# Patient Record
Sex: FEMALE | Race: OTHER
[De-identification: names, ages, dates, MRNs, and addresses within clinical notes are randomized per-mention and may not be internally consistent; named-entity substitution may affect disease eponyms.]

---

## 2020-09-21 PROBLEM — Z00.00 ENCOUNTER FOR PREVENTIVE HEALTH EXAMINATION: Status: ACTIVE | Noted: 2020-09-21

## 2020-10-07 PROBLEM — Z78.9 CAFFEINE USE: Status: ACTIVE | Noted: 2020-10-07

## 2020-10-07 PROBLEM — Z87.891 FORMER SMOKER: Status: ACTIVE | Noted: 2020-10-07

## 2020-10-07 RX ORDER — LIRAGLUTIDE 6 MG/ML
18 INJECTION SUBCUTANEOUS
Refills: 0 | Status: ACTIVE | COMMUNITY

## 2020-10-07 RX ORDER — LISINOPRIL 20 MG/1
20 TABLET ORAL
Refills: 0 | Status: ACTIVE | COMMUNITY

## 2020-10-07 RX ORDER — CHOLECALCIFEROL (VITAMIN D3) 125 MCG
50 MCG TABLET ORAL
Refills: 0 | Status: ACTIVE | COMMUNITY

## 2020-10-07 RX ORDER — LEVOTHYROXINE SODIUM 175 UG/1
175 TABLET ORAL
Refills: 0 | Status: ACTIVE | COMMUNITY

## 2020-10-07 RX ORDER — OMEPRAZOLE 40 MG/1
40 CAPSULE, DELAYED RELEASE ORAL
Refills: 0 | Status: ACTIVE | COMMUNITY

## 2020-10-07 RX ORDER — SAXAGLIPTIN AND METFORMIN HYDROCHLORIDE 5; 1000 MG/1; MG/1
5-1000 TABLET, FILM COATED, EXTENDED RELEASE ORAL
Refills: 0 | Status: ACTIVE | COMMUNITY

## 2020-10-08 ENCOUNTER — APPOINTMENT (OUTPATIENT)
Dept: HEMATOLOGY ONCOLOGY | Facility: CLINIC | Age: 50
End: 2020-10-08
Payer: COMMERCIAL

## 2020-10-08 VITALS
WEIGHT: 236 LBS | TEMPERATURE: 98.2 F | OXYGEN SATURATION: 96 % | HEIGHT: 64 IN | DIASTOLIC BLOOD PRESSURE: 60 MMHG | HEART RATE: 86 BPM | SYSTOLIC BLOOD PRESSURE: 98 MMHG | BODY MASS INDEX: 40.29 KG/M2

## 2020-10-08 DIAGNOSIS — Z86.39 PERSONAL HISTORY OF OTHER ENDOCRINE, NUTRITIONAL AND METABOLIC DISEASE: ICD-10-CM

## 2020-10-08 DIAGNOSIS — Z82.49 FAMILY HISTORY OF ISCHEMIC HEART DISEASE AND OTHER DISEASES OF THE CIRCULATORY SYSTEM: ICD-10-CM

## 2020-10-08 DIAGNOSIS — Z80.51 FAMILY HISTORY OF MALIGNANT NEOPLASM OF KIDNEY: ICD-10-CM

## 2020-10-08 DIAGNOSIS — Z87.891 PERSONAL HISTORY OF NICOTINE DEPENDENCE: ICD-10-CM

## 2020-10-08 DIAGNOSIS — Z78.9 OTHER SPECIFIED HEALTH STATUS: ICD-10-CM

## 2020-10-08 PROCEDURE — 99215 OFFICE O/P EST HI 40 MIN: CPT

## 2020-10-08 NOTE — ASSESSMENT
[FreeTextEntry1] : THis is a 51 y/o post menopausal woman with long hx of iron def anemia due to gastric bypass surgery , presenting for follow up \par \par 1) iron def  anemia \par hb was stable in sept \par ferritin stable in sept \par repeat cbc and ferritin today \par s/p full gi work up\par cont higher iron diet \par \par 2) b12 def \par cont b12 \par check level \par \par 3) ovarian cyst \par follow up gyn \par \par 4) health maint \par up todat ewith gyn and gi \par \par dw patient at  length \par check labs every 3 months and office visit every 6 months \par

## 2020-10-08 NOTE — REVIEW OF SYSTEMS
[Fatigue] : fatigue [Recent Change In Weight] : ~T recent weight change [Negative] : Allergic/Immunologic [FreeTextEntry2] : energy better , loosing weight intentionally

## 2020-10-08 NOTE — HISTORY OF PRESENT ILLNESS
[de-identified] : Ms Zamora is a 50 year old woman with a history of gastric bypass surgery and gastric banding , who has had low iron since that time. She also has a history of gastric banding procedure.  She has required IV ferrilicit  to maintain her iron levels.\par She is s/o extenisve GI work up including egd and colonosocpy \par Patient also has hx of B12 deficiency  [de-identified] : feeling well \par breast redcution on 21st is scheduled with dr escamilla \par energy has been ok , saw dr peralta yesterday , good no palptiation  on current dose of synrhorid \par lost weight , 18 pounds \par keto diet and riding horse, spin classes  \par borderline hyperthyroid \par last year last  ferrlicit infusions \par \par mammo and gyn and colon up to date\par \par 9.24.20- wbc was5 , hb  12.9, ply 322 , \par ferritin 305,  hb a1c is 5.2\par b12 450 \par

## 2020-10-09 LAB
ALBUMIN SERPL ELPH-MCNC: 4.4 G/DL
ALP BLD-CCNC: 103 U/L
ALT SERPL-CCNC: 52 U/L
ANION GAP SERPL CALC-SCNC: 19 MMOL/L
AST SERPL-CCNC: 36 U/L
BASOPHILS # BLD AUTO: 0.05 K/UL
BASOPHILS NFR BLD AUTO: 0.7 %
BILIRUB SERPL-MCNC: <0.2 MG/DL
BUN SERPL-MCNC: 23 MG/DL
CALCIUM SERPL-MCNC: 9.5 MG/DL
CHLORIDE SERPL-SCNC: 100 MMOL/L
CO2 SERPL-SCNC: 20 MMOL/L
CREAT SERPL-MCNC: 0.73 MG/DL
EOSINOPHIL # BLD AUTO: 0.08 K/UL
EOSINOPHIL NFR BLD AUTO: 1.1 %
FERRITIN SERPL-MCNC: 366 NG/ML
FOLATE SERPL-MCNC: 8.3 NG/ML
GLUCOSE SERPL-MCNC: 73 MG/DL
HCT VFR BLD CALC: 42 %
HGB BLD-MCNC: 12.9 G/DL
IMM GRANULOCYTES NFR BLD AUTO: 0.4 %
IRON SATN MFR SERPL: 26 %
IRON SERPL-MCNC: 86 UG/DL
LYMPHOCYTES # BLD AUTO: 1.6 K/UL
LYMPHOCYTES NFR BLD AUTO: 22.7 %
MAN DIFF?: NORMAL
MCHC RBC-ENTMCNC: 30.3 PG
MCHC RBC-ENTMCNC: 30.7 GM/DL
MCV RBC AUTO: 98.6 FL
MONOCYTES # BLD AUTO: 0.63 K/UL
MONOCYTES NFR BLD AUTO: 8.9 %
NEUTROPHILS # BLD AUTO: 4.67 K/UL
NEUTROPHILS NFR BLD AUTO: 66.2 %
PLATELET # BLD AUTO: 374 K/UL
POTASSIUM SERPL-SCNC: 5 MMOL/L
PROT SERPL-MCNC: 6.5 G/DL
RBC # BLD: 4.26 M/UL
RBC # FLD: 12.8 %
SODIUM SERPL-SCNC: 139 MMOL/L
TIBC SERPL-MCNC: 332 UG/DL
UIBC SERPL-MCNC: 246 UG/DL
VIT B12 SERPL-MCNC: 765 PG/ML
WBC # FLD AUTO: 7.06 K/UL

## 2021-05-12 ENCOUNTER — APPOINTMENT (OUTPATIENT)
Dept: HEMATOLOGY ONCOLOGY | Facility: CLINIC | Age: 51
End: 2021-05-12
Payer: COMMERCIAL

## 2021-05-12 VITALS
BODY MASS INDEX: 37.56 KG/M2 | DIASTOLIC BLOOD PRESSURE: 83 MMHG | HEART RATE: 85 BPM | WEIGHT: 220 LBS | TEMPERATURE: 98.3 F | OXYGEN SATURATION: 96 % | SYSTOLIC BLOOD PRESSURE: 116 MMHG | HEIGHT: 64 IN | RESPIRATION RATE: 17 BRPM

## 2021-05-12 PROCEDURE — 99214 OFFICE O/P EST MOD 30 MIN: CPT

## 2021-05-12 PROCEDURE — 99072 ADDL SUPL MATRL&STAF TM PHE: CPT

## 2021-05-12 NOTE — ASSESSMENT
[FreeTextEntry1] : THis is a 49 y/o post menopausal woman with long hx of iron def anemia due to gastric bypass surgery , presenting for follow up \par \par 1) iron def  anemia \par hb stable feb 2021 \par ferritin stable in feb 2021 \par s/p full gi work up\par cont higher iron diet \par repeat iron studies \par \par 2) b12 def \par cont b12 \par check level \par \par 3) ovarian cyst \par up todate with gyn , on ozympitic \par \par 4) health maint \par up todate with mammo gyn and colonsocopy \par \par dw patient at  length \par check labs every 3 months and office visit every 6 months \par

## 2021-05-12 NOTE — REVIEW OF SYSTEMS
[Fatigue] : fatigue [Recent Change In Weight] : ~T recent weight change [Negative] : Allergic/Immunologic [FreeTextEntry2] : more fatigue  , loosing weight intentionally

## 2021-05-12 NOTE — HISTORY OF PRESENT ILLNESS
[de-identified] : Ms Zamora is a 50 year old woman with a history of gastric bypass surgery and gastric banding , who has had low iron since that time. She also has a history of gastric banding procedure.  She has required IV ferrilicit  to maintain her iron levels.\par She is s/o extenisve GI work up including egd and colonosocpy \par Patient also has hx of B12 deficiency  [de-identified] : had breast reduction oct 2021 \par had panniculectomy  3 weeks ago , feeling tired \par went back to work after 1.5 weeks \par no bleeding \par bowels moving  well , much better \par not on percocet now \par 4 days of pain \par removed 16 pounds of extra of skin \par colonoscopy due this year \par mammo up todate before \par gyn up todate\par bp has been low taken off bp meds \par had gastric band removed , feeling a lot better now \par eating a lot better now , gerd is better , on dexilant , \par sythroid down in dose \par vit c , zinc and mag , sees tommy cuevas , \par taking b12, no iron

## 2021-05-16 LAB
FERRITIN SERPL-MCNC: 377 NG/ML
FOLATE SERPL-MCNC: 11 NG/ML
IRON SATN MFR SERPL: 17 %
IRON SERPL-MCNC: 63 UG/DL
TIBC SERPL-MCNC: 365 UG/DL
UIBC SERPL-MCNC: 302 UG/DL
VIT B12 SERPL-MCNC: 1172 PG/ML

## 2021-11-10 ENCOUNTER — APPOINTMENT (OUTPATIENT)
Dept: HEMATOLOGY ONCOLOGY | Facility: CLINIC | Age: 51
End: 2021-11-10
Payer: COMMERCIAL

## 2021-11-10 VITALS
WEIGHT: 225 LBS | OXYGEN SATURATION: 94 % | TEMPERATURE: 98.2 F | BODY MASS INDEX: 38.41 KG/M2 | RESPIRATION RATE: 18 BRPM | SYSTOLIC BLOOD PRESSURE: 105 MMHG | HEIGHT: 64 IN | DIASTOLIC BLOOD PRESSURE: 69 MMHG | HEART RATE: 86 BPM

## 2021-11-10 PROCEDURE — 99214 OFFICE O/P EST MOD 30 MIN: CPT

## 2021-11-10 NOTE — HISTORY OF PRESENT ILLNESS
[de-identified] : Ms Zamora is a 50 year old woman with a history of gastric bypass surgery and gastric banding , who has had low iron since that time. She also has a history of gastric banding procedure.  She has required IV ferrilicit  to maintain her iron levels.\par She is s/o extenisve GI work up including egd and colonosocpy \par Patient also has hx of B12 deficiency  [de-identified] : \par feeling well  overall , still a bit tired \par s/p breast reduction \par on b12, doesn’t take iron \par Labs done on November 5, 2021 show a ferritin of 116 down from 197 folic acid greater than 24 B12 626 normal CBC, ALT slightly elevated at 40 otherwise cmp ok \par took band out \par met with dr alcaraz , now  on metformin \par meetin with rheumatologist tomorrow  for psoriatic joint pains, will be started on something new such as dmard \par energy about the same , not great, not severely fatigue \par colon due this year \par mammogram

## 2021-11-10 NOTE — ASSESSMENT
[FreeTextEntry1] : THis is a 49 y/o post menopausal woman with long hx of iron def anemia due to gastric bypass surgery , presenting for follow up \par \par 1) iron def  anemia \par Labs done in November 2021 show a normal hemoglobin at 12.9 and a ferritin of 116\par s/p full gi work up- due this year for colon \par cont higher iron diet \par no evidence bleeding \par cont to montier levels \par \par 2) b12 def \par cont b12 \par nov 2021 normal level \par \par 3) ovarian cyst \par up todate with gyn\par \par 4) health maint \par up todate with mammo gyn and colonsocopy \par encouraged weight loss \par \par dw patient at  length \par check labs every 3 months and office visit every 6 months \par

## 2021-11-10 NOTE — REVIEW OF SYSTEMS
[Fatigue] : fatigue [Joint Pain] : joint pain [Skin Rash] : skin rash [Negative] : Allergic/Immunologic

## 2022-05-12 ENCOUNTER — APPOINTMENT (OUTPATIENT)
Dept: HEMATOLOGY ONCOLOGY | Facility: CLINIC | Age: 52
End: 2022-05-12
Payer: COMMERCIAL

## 2022-05-12 VITALS
BODY MASS INDEX: 40.97 KG/M2 | RESPIRATION RATE: 18 BRPM | SYSTOLIC BLOOD PRESSURE: 107 MMHG | WEIGHT: 240 LBS | HEIGHT: 64 IN | DIASTOLIC BLOOD PRESSURE: 71 MMHG | HEART RATE: 85 BPM | TEMPERATURE: 98.2 F | OXYGEN SATURATION: 98 %

## 2022-05-12 DIAGNOSIS — D51.9 VITAMIN B12 DEFICIENCY ANEMIA, UNSPECIFIED: ICD-10-CM

## 2022-05-12 DIAGNOSIS — Z98.84 BARIATRIC SURGERY STATUS: ICD-10-CM

## 2022-05-12 DIAGNOSIS — E61.1 IRON DEFICIENCY: ICD-10-CM

## 2022-05-12 DIAGNOSIS — E66.9 OBESITY, UNSPECIFIED: ICD-10-CM

## 2022-05-12 PROCEDURE — 99214 OFFICE O/P EST MOD 30 MIN: CPT

## 2022-05-12 RX ORDER — CYANOCOBALAMIN 1000 UG/ML
1000 INJECTION INTRAMUSCULAR; SUBCUTANEOUS
Qty: 3 | Refills: 3 | Status: ACTIVE | COMMUNITY
Start: 2022-05-12 | End: 1900-01-01

## 2022-05-12 RX ORDER — SYRINGE AND NEEDLE,INSULIN,1ML 31 GX5/16"
31G X 5/16" SYRINGE, EMPTY DISPOSABLE MISCELLANEOUS
Qty: 30 | Refills: 5 | Status: ACTIVE | COMMUNITY
Start: 2022-05-12 | End: 1900-01-01

## 2022-05-12 NOTE — ASSESSMENT
[FreeTextEntry1] : THis is a  53 y/o post menopausal woman with long hx of iron def anemia due to gastric bypass surgery , presenting for follow up \par \par 1) iron def  anemia \par s.p full gi work up , no eviednece of bleeding \par currently off all iron \par repeat iron studies now \par repeat cbc \par cont to montier \par \par 2) b12 def \par cont b12 \par repeat b12 level \par \par 3) ovarian cyst \par up todate with gyn, dr draper , PCOS \par all is stable , once a year \par \par 4) health maint \par up todate with mammo gyn and colonsocopy \par encouraged weight loss \par \par dw patient at  length \par follow up every 6 months \par

## 2022-05-12 NOTE — HISTORY OF PRESENT ILLNESS
[de-identified] : Ms Zamora is a 50 year old woman with a history of gastric bypass surgery and gastric banding , who has had low iron since that time. She also has a history of gastric banding procedure.  She has required IV ferrilicit  to maintain her iron levels.\par She is s/o extenisve GI work up including egd and colonosocpy \par Patient also has hx of B12 deficiency \par \par \par \par Labs done on November 5, 2021 show a ferritin of 116 down from 197 folic acid greater than 24 B12 626 normal CBC, ALT slightly elevated at 40 otherwise cmp ok \par \par  [de-identified] :  \par fell in feb 2022 , tore bicep and partial rotator \par saw rheumatologist , no psoriatic , dx with PMR on meloxicam  on skyrizi  for scalp \par joint better\par changed to wegovay for glucose intolerance ( stopped ozempic ) no improvement \par on b12 \par mammo up todate \par colonsocpy  and egd alll negative , 2 months ago \par off omeprazole \par

## 2022-05-13 ENCOUNTER — TRANSCRIPTION ENCOUNTER (OUTPATIENT)
Age: 52
End: 2022-05-13

## 2022-05-15 LAB
FERRITIN SERPL-MCNC: 126 NG/ML
FOLATE SERPL-MCNC: 19 NG/ML
VIT B12 SERPL-MCNC: 671 PG/ML

## 2022-11-14 ENCOUNTER — APPOINTMENT (OUTPATIENT)
Dept: HEMATOLOGY ONCOLOGY | Facility: CLINIC | Age: 52
End: 2022-11-14

## 2023-03-19 NOTE — REASON FOR VISIT
Problem: Infection  Goal: Absence of Infection Signs and Symptoms  Outcome: Ongoing, Progressing  Intervention: Prevent or Manage Infection  Flowsheets (Taken 3/18/2023 2355)  Fever Reduction/Comfort Measures:   fluid intake increased   lightweight bedding   lightweight clothing  Infection Management: aseptic technique maintained  Isolation Precautions: protective     Problem: Fall Injury Risk  Goal: Absence of Fall and Fall-Related Injury  Outcome: Ongoing, Progressing  Intervention: Identify and Manage Contributors  Flowsheets (Taken 3/18/2023 2355)  Self-Care Promotion: independence encouraged  Medication Review/Management: medications reviewed  Intervention: Promote Injury-Free Environment  Flowsheets (Taken 3/18/2023 2355)  Safety Promotion/Fall Prevention:   assistive device/personal item within reach   side rails raised x 3   nonskid shoes/socks when out of bed      [Follow-Up Visit] : a follow-up visit for [FreeTextEntry2] : Here for follow-up of iron deficiency anemia